# Patient Record
Sex: FEMALE | Race: WHITE | NOT HISPANIC OR LATINO | URBAN - METROPOLITAN AREA
[De-identification: names, ages, dates, MRNs, and addresses within clinical notes are randomized per-mention and may not be internally consistent; named-entity substitution may affect disease eponyms.]

---

## 2017-06-19 ENCOUNTER — APPOINTMENT (RX ONLY)
Dept: URBAN - METROPOLITAN AREA CLINIC 7 | Facility: CLINIC | Age: 82
Setting detail: DERMATOLOGY
End: 2017-06-19

## 2017-06-19 DIAGNOSIS — L82.1 OTHER SEBORRHEIC KERATOSIS: ICD-10-CM

## 2017-06-19 DIAGNOSIS — D18.0 HEMANGIOMA: ICD-10-CM

## 2017-06-19 DIAGNOSIS — L82.0 INFLAMED SEBORRHEIC KERATOSIS: ICD-10-CM

## 2017-06-19 DIAGNOSIS — R23.3 SPONTANEOUS ECCHYMOSES: ICD-10-CM

## 2017-06-19 DIAGNOSIS — Z41.9 ENCOUNTER FOR PROCEDURE FOR PURPOSES OTHER THAN REMEDYING HEALTH STATE, UNSPECIFIED: ICD-10-CM

## 2017-06-19 DIAGNOSIS — Z71.89 OTHER SPECIFIED COUNSELING: ICD-10-CM

## 2017-06-19 DIAGNOSIS — L81.4 OTHER MELANIN HYPERPIGMENTATION: ICD-10-CM

## 2017-06-19 DIAGNOSIS — D22 MELANOCYTIC NEVI: ICD-10-CM

## 2017-06-19 PROBLEM — D22.62 MELANOCYTIC NEVI OF LEFT UPPER LIMB, INCLUDING SHOULDER: Status: ACTIVE | Noted: 2017-06-19

## 2017-06-19 PROBLEM — D22.61 MELANOCYTIC NEVI OF RIGHT UPPER LIMB, INCLUDING SHOULDER: Status: ACTIVE | Noted: 2017-06-19

## 2017-06-19 PROBLEM — D22.72 MELANOCYTIC NEVI OF LEFT LOWER LIMB, INCLUDING HIP: Status: ACTIVE | Noted: 2017-06-19

## 2017-06-19 PROBLEM — D22.5 MELANOCYTIC NEVI OF TRUNK: Status: ACTIVE | Noted: 2017-06-19

## 2017-06-19 PROBLEM — D18.01 HEMANGIOMA OF SKIN AND SUBCUTANEOUS TISSUE: Status: ACTIVE | Noted: 2017-06-19

## 2017-06-19 PROBLEM — D22.71 MELANOCYTIC NEVI OF RIGHT LOWER LIMB, INCLUDING HIP: Status: ACTIVE | Noted: 2017-06-19

## 2017-06-19 PROBLEM — D48.5 NEOPLASM OF UNCERTAIN BEHAVIOR OF SKIN: Status: ACTIVE | Noted: 2017-06-19

## 2017-06-19 PROCEDURE — ? BIOPSY BY SHAVE METHOD

## 2017-06-19 PROCEDURE — ? LIQUID NITROGEN

## 2017-06-19 PROCEDURE — 99213 OFFICE O/P EST LOW 20 MIN: CPT | Mod: 25

## 2017-06-19 PROCEDURE — ? COSMETIC CONSULTATION: BROWN SPOTS

## 2017-06-19 PROCEDURE — 11100: CPT | Mod: 59

## 2017-06-19 PROCEDURE — ? PRODUCT LINE (ANTI-AGING)

## 2017-06-19 PROCEDURE — ? COUNSELING

## 2017-06-19 PROCEDURE — 17110 DESTRUCTION B9 LES UP TO 14: CPT

## 2017-06-19 ASSESSMENT — LOCATION DETAILED DESCRIPTION DERM
LOCATION DETAILED: RIGHT DISTAL POSTERIOR THIGH
LOCATION DETAILED: LEFT ANTERIOR DISTAL THIGH
LOCATION DETAILED: MIDDLE STERNUM
LOCATION DETAILED: RIGHT ANTERIOR DISTAL THIGH
LOCATION DETAILED: RIGHT ANTERIOR DISTAL UPPER ARM
LOCATION DETAILED: RIGHT ANTERIOR PROXIMAL THIGH
LOCATION DETAILED: LEFT DISTAL POSTERIOR THIGH
LOCATION DETAILED: LEFT PROXIMAL POSTERIOR UPPER ARM
LOCATION DETAILED: RIGHT DISTAL POSTERIOR UPPER ARM
LOCATION DETAILED: INFERIOR THORACIC SPINE
LOCATION DETAILED: LEFT ANTERIOR DISTAL UPPER ARM
LOCATION DETAILED: RIGHT PROXIMAL PRETIBIAL REGION
LOCATION DETAILED: LEFT LATERAL BREAST 12-1:00 REGION
LOCATION DETAILED: RIGHT SUPERIOR MEDIAL UPPER BACK
LOCATION DETAILED: EPIGASTRIC SKIN
LOCATION DETAILED: LOWER STERNUM
LOCATION DETAILED: LEFT CENTRAL MALAR CHEEK

## 2017-06-19 ASSESSMENT — LOCATION SIMPLE DESCRIPTION DERM
LOCATION SIMPLE: RIGHT POSTERIOR THIGH
LOCATION SIMPLE: LEFT POSTERIOR THIGH
LOCATION SIMPLE: RIGHT UPPER BACK
LOCATION SIMPLE: LEFT THIGH
LOCATION SIMPLE: UPPER BACK
LOCATION SIMPLE: LEFT BREAST
LOCATION SIMPLE: RIGHT PRETIBIAL REGION
LOCATION SIMPLE: CHEST
LOCATION SIMPLE: LEFT CHEEK
LOCATION SIMPLE: RIGHT UPPER ARM
LOCATION SIMPLE: RIGHT THIGH
LOCATION SIMPLE: RIGHT POSTERIOR UPPER ARM
LOCATION SIMPLE: LEFT UPPER ARM
LOCATION SIMPLE: ABDOMEN
LOCATION SIMPLE: LEFT POSTERIOR UPPER ARM

## 2017-06-19 ASSESSMENT — LOCATION ZONE DERM
LOCATION ZONE: FACE
LOCATION ZONE: TRUNK
LOCATION ZONE: LEG
LOCATION ZONE: ARM

## 2017-06-19 NOTE — PROCEDURE: LIQUID NITROGEN
Post-Care Instructions: I reviewed with the patient in detail post-care instructions. Patient is to wear sunprotection, and avoid picking at any of the treated lesions. Pt may apply Vaseline to crusted or scabbing areas.
Number Of Freeze-Thaw Cycles: 2 freeze-thaw cycles
Detail Level: Simple
Medical Necessity Clause: This procedure was medically necessary because the lesions that were treated were:
Add 52 Modifier (Optional): no
Render Post-Care Instructions In Note?: yes
Consent: The patient's consent was obtained including but not limited to risks of crusting, scabbing, blistering, scarring, darker or lighter pigmentary change, recurrence, incomplete removal and infection.
Medical Necessity Information: It is in your best interest to select a reason for this procedure from the list below. All of these items fulfill various CMS LCD requirements except the new and changing color options.

## 2017-06-19 NOTE — PROCEDURE: PRODUCT LINE (ANTI-AGING)
Product 44 Price (In Dollars - Numeric Only, No Special Characters Or $): 0.00
Name Of Product 32: Neocutis Holiday pack
Product 65 Units: 0
Name Of Product 18: Miltonifirm
Detail Level: Zone
Product 41 Price (In Dollars - Numeric Only, No Special Characters Or $): 150.00
Product 15 Price (In Dollars - Numeric Only, No Special Characters Or $): 28.00
Name Of Product 5: Skinceuticals toner
Product 28 Application Directions: trial size provided
Name Of Product 30: neodenisetis sam
Name Of Product 24: Obagi eye cream
Render Product Pricing In Note: Yes
Product 13 Price (In Dollars - Numeric Only, No Special Characters Or $): 57.00
Name Of Product 31: Advanced Brightening System
Name Of Product 27: Elta Foaming Wash
Product 19 Application Directions: Mothers day kit
Name Of Product 8: Neotensil Demo
Product 17 Application Directions: Apply Qam
Name Of Product 21: Elta clear tinted Sunscreen
Product 24 Price (In Dollars - Numeric Only, No Special Characters Or $): 124.00
Name Of Product 41: Vital Lift
Name Of Product 6: BRITT
Product 9 Price (In Dollars - Numeric Only, No Special Characters Or $): 58.00
Name Of Product 22: Skinceuticals phloretin
Name Of Product 14: Valorie
Name Of Product 33: Skinceuticals pigment corrector
Product 26 Application Directions: Apply Q am to face
Product 6 Price (In Dollars - Numeric Only, No Special Characters Or $): Complimentary
Name Of Product 34: Neocutis Biocream
Product 13 Application Directions: Apply small amount qhs
Product 13 Units: 1
Product 31 Application Directions: Apply as directed
Product 31 Price (In Dollars - Numeric Only, No Special Characters Or $): 145.00
Product 8 Price (In Dollars - Numeric Only, No Special Characters Or $): 30.00
Product 12 Application Directions: Apply 4-5 drops Qam
Product 1 Application Directions: Apply and rinse q am
Name Of Product 16: AGE Complex
Name Of Product 11: Post laser procedure kit
Name Of Product 36: Revisions Intellishade
Name Of Product 25: Glytone 0.5% foaming face wash
Product 25 Application Directions: Wash daily and rinse
Product 32 Price (In Dollars - Numeric Only, No Special Characters Or $): 100.00
Product 12 Price (In Dollars - Numeric Only, No Special Characters Or $): 165.00
Product 14 Application Directions: Apply small amount q hs\\nstrict sunscreen use with SPF 30 or above every morning
Name Of Product 23: Ciclifate
Product 32 Application Directions: Use as directed
Name Of Product 1: Avene Dermocleansing mild
Name Of Product 9: Saturday Night Brightener
Name Of Product 35: LHA Cleansing Wash
Product 11 Price (In Dollars - Numeric Only, No Special Characters Or $): 73.00
Product 10 Price (In Dollars - Numeric Only, No Special Characters Or $): 63.00
Name Of Product 28: SkinCeuticals Triple Lipid
Product 41 Application Directions: Recommended Charles Special
Product 27 Application Directions: Wash face BID then rinse
Product 6 Application Directions: VISIA - skin care regimen and chemical peels
Name Of Product 13: Skinceutical Retinol 0.5%
Product 18 Application Directions: Apply in an upwards motion on the neck BID
Product 9 Application Directions: Apply q week start with 15 minutes then rinse. Work up to over night use as tolerated. Side effects discussed.
Name Of Product 15: Avene dermocleansing milk
Name Of Product 2: Auriderm kit
Name Of Product 12: Skinceuticals Vitamin CE Serum
Name Of Product 26: Chelsea Mazariegos
Name Of Product 7: Lumiere eye cream
Product 8 Application Directions: Applied Neotensil to lower eyelids and patient let set for 5 minutes. Before and after pictures were taken.\\n\\nExcellent result- patient purchased box
Name Of Product 3: Avene Skin Recovery Cream
Name Of Product 29: Neoalexandro Freitasex
Name Of Product 4: Skinceuticals wash
Name Of Product 20: SkinCeuticals Metacell
Name Of Product 10: Skinceuticals Retinol 0.1%
Product 22 Price (In Dollars - Numeric Only, No Special Characters Or $): 172.00

## 2017-06-19 NOTE — PROCEDURE: BIOPSY BY SHAVE METHOD
Biopsy Method: Dermablade
Electrodesiccation And Curettage Text: The wound bed was treated with electrodesiccation and curettage after the biopsy was performed.
Post-Care Instructions: I reviewed with the patient in detail post-care instructions. Patient is to keep the biopsy site dry overnight, and then apply bacitracin twice daily until healed. Patient may apply hydrogen peroxide soaks to remove any crusting.
Anesthesia Volume In Cc: 0.5
Dressing: bandage
Bill [id number] For Specimen Handling/Conveyance To Laboratory?: no
Render Post-Care Instructions In Note?: yes
Lab Facility: [id number]
Notification Instructions: Patient will be notified of biopsy results. However, patient instructed to call the office if not contacted within 2 weeks.
Cryotherapy Text: The wound bed was treated with cryotherapy after the biopsy was performed.
Hemostasis: Drysol
Size Of Lesion In Cm: 0
Additional Anesthesia Volume In Cc (Will Not Render If 0): 0.2
Silver Nitrate Text: The wound bed was treated with silver nitrate after the biopsy was performed.
Type Of Destruction Used: Curettage
Billing Type: Third-Party Bill
Biopsy Type: H and E
Anesthesia Type: 1% lidocaine with epinephrine
Electrodesiccation Text: The wound bed was treated with electrodesiccation after the biopsy was performed.
Wound Care: Bacitracin
Body Location Override (Optional - Billing Will Still Be Based On Selected Body Map Location If Applicable): left breast
Consent: Written consent was obtained and risks were reviewed including but not limited to scarring, infection, bleeding, scabbing, incomplete removal, nerve damage and allergy to anesthesia.
Detail Level: Detailed
Curettage Text: The wound bed was treated with curettage after the biopsy was performed.
Lab: [id number]